# Patient Record
Sex: FEMALE | Race: WHITE | NOT HISPANIC OR LATINO | Employment: UNEMPLOYED | URBAN - NONMETROPOLITAN AREA
[De-identification: names, ages, dates, MRNs, and addresses within clinical notes are randomized per-mention and may not be internally consistent; named-entity substitution may affect disease eponyms.]

---
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
## 2025-02-24 ENCOUNTER — HOSPITAL ENCOUNTER (EMERGENCY)
Facility: HOSPITAL | Age: 20
Discharge: HOME | End: 2025-02-24
Attending: STUDENT IN AN ORGANIZED HEALTH CARE EDUCATION/TRAINING PROGRAM | Admitting: STUDENT IN AN ORGANIZED HEALTH CARE EDUCATION/TRAINING PROGRAM
Payer: MEDICAID

## 2025-02-24 ENCOUNTER — APPOINTMENT (OUTPATIENT)
Dept: GENERAL RADIOLOGY | Facility: HOSPITAL | Age: 20
End: 2025-02-24
Payer: MEDICAID

## 2025-02-24 VITALS
WEIGHT: 240 LBS | OXYGEN SATURATION: 99 % | DIASTOLIC BLOOD PRESSURE: 89 MMHG | SYSTOLIC BLOOD PRESSURE: 141 MMHG | HEIGHT: 64 IN | RESPIRATION RATE: 18 BRPM | BODY MASS INDEX: 40.97 KG/M2 | TEMPERATURE: 98.4 F | HEART RATE: 100 BPM

## 2025-02-24 DIAGNOSIS — S82.831A CLOSED FRACTURE OF DISTAL END OF RIGHT FIBULA, UNSPECIFIED FRACTURE MORPHOLOGY, INITIAL ENCOUNTER: Primary | ICD-10-CM

## 2025-02-24 PROCEDURE — 97162 PT EVAL MOD COMPLEX 30 MIN: CPT

## 2025-02-24 PROCEDURE — 73610 X-RAY EXAM OF ANKLE: CPT

## 2025-02-24 PROCEDURE — 99283 EMERGENCY DEPT VISIT LOW MDM: CPT | Performed by: STUDENT IN AN ORGANIZED HEALTH CARE EDUCATION/TRAINING PROGRAM

## 2025-02-24 PROCEDURE — 63710000001 ONDANSETRON ODT 4 MG TABLET DISPERSIBLE: Performed by: PHYSICIAN ASSISTANT

## 2025-02-24 RX ORDER — ACETAMINOPHEN 500 MG
1000 TABLET ORAL ONCE
Status: COMPLETED | OUTPATIENT
Start: 2025-02-24 | End: 2025-02-24

## 2025-02-24 RX ORDER — VENLAFAXINE HYDROCHLORIDE 37.5 MG/1
37.5 CAPSULE, EXTENDED RELEASE ORAL
COMMUNITY
Start: 2025-01-23

## 2025-02-24 RX ORDER — MONTELUKAST SODIUM 10 MG/1
1 TABLET ORAL DAILY
COMMUNITY
Start: 2024-12-05

## 2025-02-24 RX ORDER — OXYCODONE AND ACETAMINOPHEN 5; 325 MG/1; MG/1
1 TABLET ORAL EVERY 6 HOURS PRN
Qty: 12 TABLET | Refills: 0 | Status: SHIPPED | OUTPATIENT
Start: 2025-02-24 | End: 2025-02-28 | Stop reason: HOSPADM

## 2025-02-24 RX ORDER — ONDANSETRON 4 MG/1
4 TABLET, ORALLY DISINTEGRATING ORAL ONCE
Status: COMPLETED | OUTPATIENT
Start: 2025-02-24 | End: 2025-02-24

## 2025-02-24 RX ORDER — LEVOCETIRIZINE DIHYDROCHLORIDE 5 MG/1
1 TABLET, FILM COATED ORAL DAILY
COMMUNITY
Start: 2024-12-05

## 2025-02-24 RX ORDER — OXYCODONE HYDROCHLORIDE 5 MG/1
5 TABLET ORAL ONCE
Status: COMPLETED | OUTPATIENT
Start: 2025-02-24 | End: 2025-02-24

## 2025-02-24 RX ORDER — BUSPIRONE HYDROCHLORIDE 5 MG/1
1 TABLET ORAL EVERY 12 HOURS SCHEDULED
COMMUNITY
Start: 2025-01-23

## 2025-02-24 RX ORDER — ONDANSETRON 4 MG/1
4 TABLET, ORALLY DISINTEGRATING ORAL EVERY 8 HOURS PRN
Qty: 12 TABLET | Refills: 0 | Status: SHIPPED | OUTPATIENT
Start: 2025-02-24

## 2025-02-24 RX ADMIN — ACETAMINOPHEN 1000 MG: 500 TABLET, FILM COATED ORAL at 08:49

## 2025-02-24 RX ADMIN — OXYCODONE HYDROCHLORIDE 5 MG: 5 TABLET ORAL at 09:35

## 2025-02-24 RX ADMIN — ONDANSETRON 4 MG: 4 TABLET, ORALLY DISINTEGRATING ORAL at 09:35

## 2025-02-24 NOTE — ED PROVIDER NOTES
"Subjective:  Chief Complaint:  Fall    History of Present Illness:  Patient is a 19-year-old female with history of anxiety, asthma, depression, hypertension presenting to the ER with complaints of right ankle pain after mechanical fall from a black tarp this morning.  This happened just prior to arrival.  She complains of pain to the ankle but no other injuries.  Denies head trauma, loss of consciousness, anticoagulant use.  Denies any medications prior to arrival.  Denies any chance of pregnancy.      Nurses Notes reviewed and agree, including vitals, allergies, social history and prior medical history.     REVIEW OF SYSTEMS: All systems reviewed and not pertinent unless noted.  Review of Systems   Musculoskeletal:         Right ankle pain   All other systems reviewed and are negative.      Past Medical History:   Diagnosis Date    Anxiety     Asthma     Depression     Hypertension        Allergies:    Ibuprofen      History reviewed. No pertinent surgical history.      Social History     Socioeconomic History    Marital status: Single         History reviewed. No pertinent family history.    Objective  Physical Exam:  /89   Pulse 100   Temp 98.4 °F (36.9 °C) (Oral)   Resp 18   Ht 162.6 cm (64\")   Wt 109 kg (240 lb)   SpO2 99%   BMI 41.20 kg/m²      Physical Exam  Vitals and nursing note reviewed.   Constitutional:       General: She is not in acute distress.     Appearance: She is not toxic-appearing.   HENT:      Head: Normocephalic and atraumatic.      Right Ear: External ear normal.      Left Ear: External ear normal.      Nose: Nose normal.   Eyes:      Extraocular Movements: Extraocular movements intact.      Conjunctiva/sclera: Conjunctivae normal.   Cardiovascular:      Rate and Rhythm: Normal rate.   Pulmonary:      Effort: Pulmonary effort is normal. No respiratory distress.   Abdominal:      General: There is no distension.   Musculoskeletal:      Cervical back: Normal range of motion and " neck supple.      Comments: Right lower extremity: 2+ DP and PT pulses, normal color, no obvious deformity, no significant swelling, tenderness to palpation noted and mild pain with range of motion   Skin:     General: Skin is warm and dry.   Neurological:      General: No focal deficit present.      Mental Status: She is alert and oriented to person, place, and time.   Psychiatric:         Mood and Affect: Mood normal.         Behavior: Behavior normal.         Splint - Cast - Strapping    Date/Time: 2/24/2025 9:29 AM    Performed by: Patricia Francis PA-C  Authorized by: Marc Genao MD    Consent:     Consent obtained:  Verbal    Consent given by:  Patient    Risks, benefits, and alternatives were discussed: yes      Risks discussed:  Discoloration, numbness, pain and swelling    Alternatives discussed:  Referral  Universal protocol:     Patient identity confirmed:  Verbally with patient  Pre-procedure details:     Distal neurologic exam:  Normal    Distal perfusion: distal pulses strong and brisk capillary refill    Procedure details:     Location:  Leg    Leg location:  R lower leg    Splint type:  Ankle stirrup and short leg    Supplies:  Fiberglass    Attestation: Splint applied and adjusted personally by me    Post-procedure details:     Distal neurologic exam:  Normal    Distal perfusion: unchanged      Procedure completion:  Tolerated well, no immediate complications      ED Course:         Lab Results (last 24 hours)       ** No results found for the last 24 hours. **             XR Ankle 3+ View Right    Result Date: 2/24/2025  PROCEDURE: XR ANKLE 3+ VW RIGHT-  HISTORY: pain after fall  FINDINGS: A three view exam demonstrates oblique, minimally displaced distal right fibular metaphyseal fracture associated with mild soft tissue swelling. No ankle effusion seen..  No other fracture identified.      Impression: Acute fracture as above.      This report was signed and finalized on 2/24/2025 9:05 AM by  Letitia Verde MD.          MDM  Patient evaluated in the ER for right ankle pain after mechanical fall this morning prior to arrival.  She is hemodynamically stable, afebrile, nontoxic-appearing on exam.  No obvious swelling or deformity, neurovascularly intact extremity.  Differential diagnosis includes but is not limited to fracture, sprain/strain, among others.  Initial plan includes x-ray of right ankle and initial treatment with Tylenol and RICE therapy.    X-ray as reported above reveals an acute distal right fibular fracture, minimally displaced.  Imaging was reviewed by myself as well.  Do not feel that reduction is necessary.  Patient placed in a short leg splint with a stirrup.  She tolerated the procedure well.  There were no immediate complications.  She was given oxycodone in the ER for pain control.  Prescription was sent for Percocet, Zofran.  Patient given crutches.  Referral placed for Dr. Penn, orthopedic specialist, for further outpatient evaluation and definitive care.  Precautions were given for return to the ER for any new or worsening symptoms.    Final diagnoses:   Closed fracture of distal end of right fibula, unspecified fracture morphology, initial encounter          Patricia Francis, LAURA  02/24/25 0974

## 2025-02-24 NOTE — DISCHARGE INSTRUCTIONS
Keep splint dry and in place until follow-up with orthopedic specialist, Dr. Penn, for further outpatient evaluation and definitive care.  Take pain and nausea medications as prescribed as needed.  Rest, ice, elevate the extremity at home.  Return to the ER for any new or worsening symptoms or acute concerns.

## 2025-02-24 NOTE — THERAPY EVALUATION
Patient Name: Eri Lay  : 2005    MRN: [id number]                              Today's Date: 2025       Admit Date: 2025    Visit Dx:     ICD-10-CM ICD-9-CM   1. Closed fracture of distal end of right fibula, unspecified fracture morphology, initial encounter  S82.831A 824.8     There is no problem list on file for this patient.    Past Medical History:   Diagnosis Date    Anxiety     Asthma     Depression     Hypertension      History reviewed. No pertinent surgical history.   General Information       Row Name 25 1221          Physical Therapy Time and Intention    Document Type discharge evaluation/summary  -MS     Mode of Treatment physical therapy  -MS       Row Name 25 1221          General Information    Patient Profile Reviewed yes  -MS     Prior Level of Function independent:;all household mobility  -MS               User Key  (r) = Recorded By, (t) = Taken By, (c) = Cosigned By      Initials Name Provider Type    Nathan Cruz, PT Physical Therapist                   Mobility       Row Name 25 1222          Sit-Stand Transfer    Sit-Stand [place] (Transfers) modified independence  -MS     Assistive Device (Sit-Stand Transfers) walker, front-wheeled  -MS       Row Name 25 1222          Gait/Stairs (Locomotion)    [place] Level (Gait) modified independence  -MS     Assistive Device (Gait) walker, front-wheeled  -MS     Patient was able to Ambulate yes  -MS     Distance in Feet (Gait) 30  -MS     Deviations/Abnormal Patterns (Gait) gait speed decreased  -MS       Row Name 25 1222          Mobility    Extremity Weight-bearing Status right lower extremity  -MS     Right Lower Extremity (Weight-bearing Status) non weight-bearing (NWB)  -MS               User Key  (r) = Recorded By, (t) = Taken By, (c) = Cosigned By      Initials Name Provider Type    Nathan Cruz, PT Physical Therapist                   Obj/Interventions       Row Name  02/24/25 1224          Range of Motion Comprehensive    General Range of Motion lower extremity range of motion deficits identified  -MS       Row Name 02/24/25 1224          Strength Comprehensive (MMT)    General Manual Muscle Testing (MMT) Assessment lower extremity strength deficits identified  -MS     Comment, General Manual Muscle Testing (MMT) Assessment B LE 4-/5  -MS       Row Name 02/24/25 1224          Sensory Assessment (Somatosensory)    Sensory Assessment (Somatosensory) sensation intact  -MS               User Key  (r) = Recorded By, (t) = Taken By, (c) = Cosigned By      Initials Name Provider Type    MS Nathan Chowdhury, PT Physical Therapist                   Goals/Plan    No documentation.                  Clinical Impression       Row Name 02/24/25 1225          Pain    Pretreatment Pain Rating 5/10  -MS     Posttreatment Pain Rating 5/10  -MS     Pain Side/Orientation right;lower  leg  -MS       Row Name 02/24/25 1225          Plan of Care Review    Plan of Care Reviewed With patient;friend  -MS     Progress improving  -MS     Outcome Evaluation Pt seen for PT eval this date due to difficulty with ambulation. Pt presents with R fibula fx. Pt is NWB. Pt was able to ambulate 30ft with Rwx and good technique. Pt's concern is that she resides on a 4th floor dorm room, and the dorm does not have an elevator. Recommended Pt reach out to the disability office at [facility] for assistance and acommadations. Pt discharging today.  -MS       Row Name 02/24/25 1225          Therapy Assessment/Plan (PT)    Criteria for Skilled Interventions Met (PT) no;no problems identified which require skilled intervention  -MS     Therapy Frequency (PT) evaluation only  -MS       Row Name 02/24/25 1225          Positioning and Restraints    Pre-Treatment Position sitting in chair/recliner  -MS     Post Treatment Position chair  -MS               User Key  (r) = Recorded By, (t) = Taken By, (c) = Cosigned By      Initials Name  Provider Type    Nathan Cruz, PT Physical Therapist                   Outcome Measures       Row Name 02/24/25 1248          How much help from another person do you currently need...    Turning from your back to your side while in flat bed without using bedrails? 4  -MS     Moving from lying on back to sitting on the side of a flat bed without bedrails? 4  -MS     Moving to and from a bed to a chair (including a wheelchair)? 4  -MS     Standing up from a chair using your arms (e.g., wheelchair, bedside chair)? 4  -MS     Climbing 3-5 steps with a railing? 1  -MS     To walk in hospital room? 3  -MS     AM-PAC 6 Clicks Score (PT) 20  -MS     Highest Level of Mobility Goal 6 --> Walk 10 steps or more  -MS       Row Name 02/24/25 1248          Functional Assessment    Outcome Measure Options AM-PAC 6 Clicks Basic Mobility (PT)  -MS               User Key  (r) = Recorded By, (t) = Taken By, (c) = Cosigned By      Initials Name Provider Type    Nathan Cruz, PT Physical Therapist                                   PT Recommendation and Plan     Progress: improving  Outcome Evaluation: Pt seen for PT eval this date due to difficulty with ambulation. Pt presents with R fibula fx. Pt is NWB. Pt was able to ambulate 30ft with Rwx and good technique. Pt's concern is that she resides on a 4th floor dorm room, and the dorm does not have an elevator. Recommended Pt reach out to the disability office at [facility] for assistance and acommadations. Pt discharging today.     Time Calculation:         PT Charges       Row Name 02/24/25 1252             Time Calculation    Start Time 1100  -MS      PT Received On 02/24/25  -MS      PT Goal Re-Cert Due Date 03/06/25  -MS         Untimed Charges    PT Eval/Re-eval Minutes 56  -MS         Total Minutes    Untimed Charges Total Minutes 56  -MS       Total Minutes 56  -MS                User Key  (r) = Recorded By, (t) = Taken By, (c) = Cosigned By      Initials Name Provider Type     MS Nathan Chowdhury, PT Physical Therapist                  Therapy Charges for Today       Code Description Service Date Service Provider Modifiers Qty    [id number] HC PT EVAL MOD COMPLEXITY 4 2/24/2025 aNthan Chowdhury, PT GP 1            PT G-Codes  Outcome Measure Options: AM-PAC 6 Clicks Basic Mobility (PT)  AM-PAC 6 Clicks Score (PT): 20       Nathan Chowdhury, PT  2/24/2025

## 2025-02-24 NOTE — PLAN OF CARE
Goal Outcome Evaluation:  Plan of Care Reviewed With: patient, friend        Progress: improving  Outcome Evaluation: Pt seen for PT eval this date due to difficulty with ambulation. Pt presents with R fibula fx. Pt is NWB. Pt was able to ambulate 30ft with Rwx and good technique. Pt's concern is that she resides on a 4th floor dorm room, and the dorm does not have an elevator. Recommended Pt reach out to the disability office at [employer] for assistance and acommadations. Pt discharging today.

## 2025-02-26 ENCOUNTER — OFFICE VISIT (OUTPATIENT)
Dept: ORTHOPEDIC SURGERY | Facility: CLINIC | Age: 20
End: 2025-02-26
Payer: MEDICAID

## 2025-02-26 ENCOUNTER — HOSPITAL ENCOUNTER (OUTPATIENT)
Dept: MRI IMAGING | Facility: HOSPITAL | Age: 20
Discharge: HOME | End: 2025-02-26
Admitting: STUDENT IN AN ORGANIZED HEALTH CARE EDUCATION/TRAINING PROGRAM
Payer: MEDICAID

## 2025-02-26 VITALS
SYSTOLIC BLOOD PRESSURE: 130 MMHG | WEIGHT: 240 LBS | BODY MASS INDEX: 40.97 KG/M2 | DIASTOLIC BLOOD PRESSURE: 80 MMHG | HEIGHT: 64 IN

## 2025-02-26 DIAGNOSIS — S82.831A OTHER CLOSED FRACTURE OF DISTAL END OF RIGHT FIBULA, INITIAL ENCOUNTER: Primary | ICD-10-CM

## 2025-02-26 DIAGNOSIS — S82.831A OTHER CLOSED FRACTURE OF DISTAL END OF RIGHT FIBULA, INITIAL ENCOUNTER: ICD-10-CM

## 2025-02-26 DIAGNOSIS — M25.571 ARTHRALGIA OF ANKLE, RIGHT: ICD-10-CM

## 2025-02-26 PROCEDURE — 73721 MRI JNT OF LWR EXTRE W/O DYE: CPT

## 2025-02-26 NOTE — PROGRESS NOTES
Office Note     Name: Eri Lay    : 2005     MRN: [id number]     Chief Complaint  Fracture and Injury of the Right Leg (Right leg injury on 25. She rolled her ankle while walking on [facility]. Went to [facility] ER after she fell. Placed in splint. )    Subjective     History of Present Illness:  Eri Lay is a 19 y.o. female presenting today for the evaluation of acute right ankle injury that occurred on 2025 after the mechanism described above.  Patient complains of pain primarily over the lateral aspect of the ankle and distal lower leg.  She denies tenderness in the medial ankle, base of fifth metatarsal, foot.  She does have occasional tingling in her foot but denies numbness.  She denies previous injury to the affected area.  She was initially seen in the emergency department and placed in a short leg splint and given orthopedic follow-up.  She was prescribed Percocet by the ER for pain.  Presents with her mother.  Patient is a full-time student at the .    Pertinent ER note reviewed, 1 study of pertinent imaging reviewed.      Review of Systems     Past Medical History:   Diagnosis Date    Anxiety     Asthma     Depression     Hypertension         No past surgical history on file.    No family history on file.    Social History     Socioeconomic History    Marital status: Single         Current Outpatient Medications:     budesonide (PULMICORT) 90 MCG/ACT inhaler, Inhale 1 puff 2 (Two) Times a Day., Disp: , Rfl:     budesonide-formoterol (SYMBICORT) 160-4.5 MCG/ACT inhaler, Inhale 2 puffs 2 (Two) Times a Day., Disp: , Rfl:     busPIRone (BUSPAR) 5 MG tablet, Take 1 tablet by mouth Every 12 (Twelve) Hours., Disp: , Rfl:     levocetirizine (XYZAL) 5 MG tablet, Take 1 tablet by mouth Daily., Disp: , Rfl:     montelukast (SINGULAIR) 10 MG tablet, Take 1 tablet by mouth Daily., Disp: , Rfl:     ondansetron ODT (ZOFRAN-ODT) 4 MG disintegrating tablet, Place 1 tablet on the tongue Every 8  "(Eight) Hours As Needed for Nausea or Vomiting., Disp: 12 tablet, Rfl: 0    oxyCODONE-acetaminophen (PERCOCET) 5-325 MG per tablet, Take 1 tablet by mouth Every 6 (Six) Hours As Needed for Severe Pain., Disp: 12 tablet, Rfl: 0    venlafaxine XR (EFFEXOR-XR) 37.5 MG 24 hr capsule, Take 1 capsule by mouth every night at bedtime., Disp: , Rfl:     Allergies   Allergen Reactions    Ibuprofen Shortness Of Breath           Objective   /80   Ht 162.6 cm (64\")   Wt 109 kg (240 lb)   BMI 41.20 kg/m²    Pediatric BMI = >99 %ile (Z= 2.36) based on CDC (Girls, 2-20 Years) BMI-for-age based on BMI available on 2/26/2025.. Class 3 Severe Obesity (BMI >=40). Obesity-related health conditions include the following: none. Obesity is improving with lifestyle modifications. BMI is is above average; BMI management plan is completed. We discussed low calorie, low carb based diet program, portion control, increasing exercise, and joining a fitness center or start home based exercise program.       Physical Exam  Right Ankle Exam     Tenderness   The patient is experiencing tenderness in the lateral malleolus and ATF.  Swelling: mild    Other   Erythema: absent  Sensation: normal  Pulse: present     Comments:  Positive fibular compression test.  No tenderness along the fibula.  No tenderness at the fibular head.  Normal inspection of the right knee.  Distal neurovascular checks within normal limits.  Moves all toes well.  No tenderness at base of fifth metatarsal or Lisfranc joint.           Extremity DVT signs are negative by clinical screen.     Independent Review of Radiographic Studies:    I personally reviewed the available, pertinent imaging studies and I agree with the radiologist's interpretation.    XR Ankle 3+ View Right  Order date: 2/24/2025  Authorizing: Patricia Francis PA-C  Ordered by Patricia Francis PA-C on 2/24/2025.     Narrative & Impression    PROCEDURE: XR ANKLE 3+ VW RIGHT-     HISTORY: pain after fall   "   FINDINGS: A three view exam demonstrates oblique, minimally displaced  distal right fibular metaphyseal fracture associated with mild soft  tissue swelling. No ankle effusion seen..  No other fracture identified.     IMPRESSION:  Acute fracture as above.     This report was signed and finalized on 2/24/2025 9:05 AM by Letitia Verde MD.        All I do agree with the radiologist interpretation, there does appear to be mild widening of the medial clear space.  Measured at approximately 4.5 mm.    Procedures    Assessment and Plan   Diagnoses and all orders for this visit:    1. Other closed fracture of distal end of right fibula, initial encounter (Primary)  -     MRI Ankle Right Without Contrast; Future    2. Arthralgia of ankle, right       Discussion of orthopedic goals  Orthopedic activities reviewed and patient expressed appreciation  Risk, benefits, and merits of treatment alternatives reviewed with the patient and questions answered  Patient guided on mobility and conditioning exercises  Ice, heat, and/or modalities as needed and beneficial  Elevate leg for residual swelling  Reduced physical activity as appropriate and avoid aggravating activities.   Weight bearing parameters reviewed.   Use brace as instructed.     Recommendations/Plan:  Exercise, medications, injections, other patient advice, and return appointment as noted.  Brace: High tide pneumatic walking boot  Test/Studies: MRI right ankle without contrast stat  Patient and/or guardian(s) were encouraged to call or return to the office for any issues or concerns.    At this time a syndesmotic injury cannot be ruled out and physical exam and imaging is suggestive of syndesmotic injury.  An MRI of the right ankle stat was ordered for further evaluation.  Today patient was placed in a high tide pneumatic walking boot and advised nonweightbearing.  Advised the use of a knee scooter for ambulation.  Advise continuing Percocet as needed for pain once pain  improves transition to over-the-counter Tylenol as needed.  Will follow-up with patient after MRI for further evaluation.    Return for Follow-up after MRI.

## 2025-02-27 ENCOUNTER — PRE-ADMISSION TESTING (OUTPATIENT)
Dept: PREADMISSION TESTING | Facility: HOSPITAL | Age: 20
End: 2025-02-27
Payer: MEDICAID

## 2025-02-27 ENCOUNTER — OFFICE VISIT (OUTPATIENT)
Dept: ORTHOPEDIC SURGERY | Facility: CLINIC | Age: 20
End: 2025-02-27
Payer: MEDICAID

## 2025-02-27 ENCOUNTER — PREP FOR SURGERY (OUTPATIENT)
Dept: OTHER | Facility: HOSPITAL | Age: 20
End: 2025-02-27
Payer: MEDICAID

## 2025-02-27 ENCOUNTER — HOSPITAL ENCOUNTER (OUTPATIENT)
Dept: GENERAL RADIOLOGY | Facility: HOSPITAL | Age: 20
Discharge: HOME | End: 2025-02-27
Payer: MEDICAID

## 2025-02-27 VITALS
WEIGHT: 240 LBS | DIASTOLIC BLOOD PRESSURE: 68 MMHG | BODY MASS INDEX: 40.97 KG/M2 | HEIGHT: 64 IN | SYSTOLIC BLOOD PRESSURE: 126 MMHG

## 2025-02-27 VITALS — WEIGHT: 240 LBS | HEIGHT: 64 IN | BODY MASS INDEX: 40.97 KG/M2

## 2025-02-27 DIAGNOSIS — S82.831D OTHER CLOSED FRACTURE OF DISTAL END OF RIGHT FIBULA WITH ROUTINE HEALING, SUBSEQUENT ENCOUNTER: Primary | ICD-10-CM

## 2025-02-27 DIAGNOSIS — Z01.818 PRE-OP TESTING: Primary | ICD-10-CM

## 2025-02-27 DIAGNOSIS — S93.491D SPRAIN OF ANTERIOR TALOFIBULAR LIGAMENT OF RIGHT ANKLE, SUBSEQUENT ENCOUNTER: ICD-10-CM

## 2025-02-27 DIAGNOSIS — S93.431D SYNDESMOTIC DISRUPTION OF RIGHT ANKLE, SUBSEQUENT ENCOUNTER: ICD-10-CM

## 2025-02-27 DIAGNOSIS — Z01.818 PRE-OP TESTING: ICD-10-CM

## 2025-02-27 LAB
BASOPHILS # BLD AUTO: 0.1 10*3/MM3 (ref 0–0.2)
BASOPHILS NFR BLD AUTO: 1.1 % (ref 0–1.5)
BILIRUB UR QL STRIP: NEGATIVE
CLARITY UR: CLEAR
COLOR UR: YELLOW
DEPRECATED RDW RBC AUTO: 40.9 FL (ref 37–54)
EOSINOPHIL # BLD AUTO: 0.67 10*3/MM3 (ref 0–0.4)
EOSINOPHIL NFR BLD AUTO: 7.6 % (ref 0.3–6.2)
ERYTHROCYTE [DISTWIDTH] IN BLOOD BY AUTOMATED COUNT: 14.9 % (ref 12.3–15.4)
GLUCOSE UR STRIP-MCNC: NEGATIVE MG/DL
HCT VFR BLD AUTO: 42.2 % (ref 34–46.6)
HGB BLD-MCNC: 13 G/DL (ref 12–15.9)
HGB UR QL STRIP.AUTO: NEGATIVE
IMM GRANULOCYTES # BLD AUTO: 0.03 10*3/MM3 (ref 0–0.05)
IMM GRANULOCYTES NFR BLD AUTO: 0.3 % (ref 0–0.5)
KETONES UR QL STRIP: NEGATIVE
LEUKOCYTE ESTERASE UR QL STRIP.AUTO: NEGATIVE
LYMPHOCYTES # BLD AUTO: 1.87 10*3/MM3 (ref 0.7–3.1)
LYMPHOCYTES NFR BLD AUTO: 21.3 % (ref 19.6–45.3)
MCH RBC QN AUTO: 23.6 PG (ref 26.6–33)
MCHC RBC AUTO-ENTMCNC: 30.8 G/DL (ref 31.5–35.7)
MCV RBC AUTO: 76.4 FL (ref 79–97)
MONOCYTES # BLD AUTO: 0.6 10*3/MM3 (ref 0.1–0.9)
MONOCYTES NFR BLD AUTO: 6.8 % (ref 5–12)
NEUTROPHILS NFR BLD AUTO: 5.53 10*3/MM3 (ref 1.7–7)
NEUTROPHILS NFR BLD AUTO: 62.9 % (ref 42.7–76)
NITRITE UR QL STRIP: NEGATIVE
NRBC BLD AUTO-RTO: 0 /100 WBC (ref 0–0.2)
PH UR STRIP.AUTO: 6 [PH] (ref 5–8)
PLATELET # BLD AUTO: 432 10*3/MM3 (ref 140–450)
PMV BLD AUTO: 9.4 FL (ref 6–12)
PROT UR QL STRIP: NEGATIVE
RBC # BLD AUTO: 5.52 10*6/MM3 (ref 3.77–5.28)
SP GR UR STRIP: 1.02 (ref 1–1.03)
UROBILINOGEN UR QL STRIP: NORMAL
WBC NRBC COR # BLD AUTO: 8.8 10*3/MM3 (ref 3.4–10.8)

## 2025-02-27 PROCEDURE — 81003 URINALYSIS AUTO W/O SCOPE: CPT

## 2025-02-27 PROCEDURE — 36415 COLL VENOUS BLD VENIPUNCTURE: CPT

## 2025-02-27 PROCEDURE — 85025 COMPLETE CBC W/AUTO DIFF WBC: CPT

## 2025-02-27 PROCEDURE — 71046 X-RAY EXAM CHEST 2 VIEWS: CPT

## 2025-02-27 RX ORDER — FAMOTIDINE 10 MG/ML
20 INJECTION, SOLUTION INTRAVENOUS
Status: CANCELLED | OUTPATIENT
Start: 2025-02-28 | End: 2025-03-01

## 2025-02-27 NOTE — H&P (VIEW-ONLY)
Office Note     Name: Eri Lay    : 2005     MRN: [id number]     Chief Complaint  Follow-up of the Right Ankle (MRI results)    Subjective     History of Present Illness:  Eri Lay is a 19 y.o. female presenting today for right ankle follow-up and to discuss results of recent MRI.  MRI results will be shown below.  Denies any new or worsening symptoms since her last visit with me.      Review of Systems   Musculoskeletal:  Positive for arthralgias (right ankle) and joint swelling (right ankle).   Skin:  Positive for color change (right ankle/foot bruising).        Past Medical History:   Diagnosis Date    Anxiety     Asthma     Depression     Hypertension         History reviewed. No pertinent surgical history.    Family History   Problem Relation Age of Onset    Hypertension Mother     Heart disease Maternal Grandmother        Social History     Socioeconomic History    Marital status: Single   Tobacco Use    Smoking status: Never    Smokeless tobacco: Never   Vaping Use    Vaping status: Never Used   Substance and Sexual Activity    Alcohol use: Never    Drug use: Never    Sexual activity: Defer         Current Outpatient Medications:     budesonide (PULMICORT) 90 MCG/ACT inhaler, Inhale 1 puff 2 (Two) Times a Day., Disp: , Rfl:     budesonide-formoterol (SYMBICORT) 160-4.5 MCG/ACT inhaler, Inhale 2 puffs 2 (Two) Times a Day., Disp: , Rfl:     busPIRone (BUSPAR) 5 MG tablet, Take 1 tablet by mouth Every 12 (Twelve) Hours., Disp: , Rfl:     levocetirizine (XYZAL) 5 MG tablet, Take 1 tablet by mouth Daily., Disp: , Rfl:     montelukast (SINGULAIR) 10 MG tablet, Take 1 tablet by mouth Daily., Disp: , Rfl:     ondansetron ODT (ZOFRAN-ODT) 4 MG disintegrating tablet, Place 1 tablet on the tongue Every 8 (Eight) Hours As Needed for Nausea or Vomiting., Disp: 12 tablet, Rfl: 0    oxyCODONE-acetaminophen (PERCOCET) 5-325 MG per tablet, Take 1 tablet by mouth Every 6 (Six) Hours As Needed for Severe  "Pain., Disp: 12 tablet, Rfl: 0    venlafaxine XR (EFFEXOR-XR) 37.5 MG 24 hr capsule, Take 1 capsule by mouth every night at bedtime., Disp: , Rfl:     Allergies   Allergen Reactions    Ibuprofen Shortness Of Breath           Objective   /68 (BP Location: Left arm, Patient Position: Sitting)   Ht 162.6 cm (64\")   Wt 109 kg (240 lb)   BMI 41.20 kg/m²            Physical Exam  Constitutional:       Appearance: Normal appearance. She is obese. She is not ill-appearing.   Cardiovascular:      Rate and Rhythm: Normal rate and regular rhythm.      Heart sounds: Normal heart sounds.   Pulmonary:      Effort: Pulmonary effort is normal.      Breath sounds: Normal breath sounds.   Abdominal:      General: Abdomen is flat. Bowel sounds are normal.      Palpations: Abdomen is soft.   Psychiatric:         Mood and Affect: Mood normal.         Behavior: Behavior normal.         Thought Content: Thought content normal.         Judgment: Judgment normal.       Ortho Exam   Extremity DVT signs are negative by clinical screen.     Independent Review of Radiographic Studies:    MRI Ankle Right Without Contrast  Order date: 2/26/2025  Authorizing: Vj Seth PA-C  Ordered by Vj Seth PA-C on 2/26/2025.     Narrative & Impression    PROCEDURE: MRI ANKLE RIGHT WO CONTRAST-        MR RIGHT ANKLE        HISTORY: Acute distal fibula fracture, Calderón C, evaluate syndesmosis.   Mild widening medial clear space noted on plain films, positive fibular  compression test; S82.831A-Other fracture of upper and lower end of  right fibula, initial encounter for closed fracture     TECHNIQUE: Multiplanar MR without gadolinium enhancement.     FINDINGS:     ARTICULAR CARTILAGE: No focal defects.     MARROW SIGNAL: Normal.     JOINT FLUID: Moderate size joint effusion     LIGAMENTS: Complete tear of the anterior tibiofibular and talofibular  ligaments. Posterior talofibular and tibiofibular ligaments are intact.  There is disruption of " the tibiofibular syndesmosis.     TENDONS: Unremarkable.     PLANTAR FASCIA: Unremarkable.      IMPRESSION:  1. Complete tears of the ATFL and anterior tibiofibular ligament with  disruption of the tibiofibular syndesmosis  2. PT FL and posterior tibiofibular ligament intact  3. No acute osseous abnormality        This report was signed and finalized on 2/26/2025 1:37 PM by Levon Benavides MD.       Procedures    Assessment and Plan   Diagnoses and all orders for this visit:    1. Other closed fracture of distal end of right fibula with routine healing, subsequent encounter (Primary)    2. Sprain of anterior talofibular ligament of right ankle, subsequent encounter    3. Syndesmotic disruption of right ankle, subsequent encounter       Discussion of orthopedic goals  Orthopedic activities reviewed and patient expressed appreciation  Risk, benefits, and merits of treatment alternatives reviewed with the patient and questions answered  The nature of the proposed surgical procedure(s) was reviewed with the patient including the risks, benefits, rehabilitation, recovery timeframe, and outcome expectations  Ice, heat, and/or modalities as needed and beneficial  Elevate foot for residual swelling  Discussed proper wound care with the patient.   The signs and symptoms of an infection were discuss with the patient.  I advised observation for signs and symptoms of infection.  Discussed gentle range of motion activities/exercises for the affected joint with the patient.    Reduced physical activity as appropriate and avoid aggravating activities.   Weight bearing parameters reviewed.   Use brace as instructed.   Take prescribed medications as instructed and only as tolerated.  An assistive device was encouraged for safety and support. This may include crutches, a cane, walker, rollator.     Recommendations/Plan:  Exercise, medications, injections, other patient advice, and return appointment as noted.  Surgery: Surgery  proposed at this visit as noted.  Patient and/or guardian(s) were encouraged to call or return to the office for any issues or concerns.    I discussed conservative and surgical treatment options with the patient.  Given the disruption to the syndesmosis ligament I recommended surgical fixation of the fracture and ligament to decrease morbidity.  I discussed the risk benefits outcomes and expected recovery time of the proposed procedure and patient is agreeable to continue forward with surgery.  She will go over for PAT today.  Tentative surgery date of 2/28/2025.    Return for 2-week postop.

## 2025-02-27 NOTE — DISCHARGE INSTRUCTIONS
Pre-Admission testing appointment completed today for patient's upcoming procedure here at [facility].    PAT PASS reviewed with patient and they verbalize understanding of the following:     Do not eat or drink anything after midnight the night before procedure unless otherwise instructed by physician/surgeon's office, this includes no gum, candy, mints, tobacco products or e-cigarettes.  Do not shave the area to be operated on at least 48 hours prior to procedure.  Do not wear makeup, lotion, hair products, or nail polish.  Do not wear any jewelry and remove all piercings.  Do not wear any adhesive if you wear dentures.  Do not wear contacts; bring in glasses if needed.  Only take medications on the morning of procedure as instructed by PAT nurse per anesthesia guidelines or as instructed by physician's office.  If you are on any blood thinners reach out to the physician/surgeon's office for instructions on when/if they will need to be stopped prior to procedure.  Bring in picture ID and insurance card, advanced directive copies if applicable, CPAP/BIPAP/Inhalers if indicated morning of procedure, leave any other valuables at home.  Ensure you have arranged for someone to drive you home the day of your procedure and someone to care for you at home afterwards. It is recommended that you do not drive, drink alcohol, or make any major legal decisions for at least 24 hours after your procedure is complete.  Chlorhexadine sponge along with instruction/information sheet given to patient. Readybath wipes given in lieu of CHG if patient has CHG allergy. Instructed patient to date, time, and initial the verification sheet once skin prep has been completed, and to return to [facility] the day of the procedure.  ERAS instructions given to patient unless otherwise instructed per surgeon's orders.     Anesthesia FAQ tip sheet given to patient.  Instructions and information given to patient about parking, hospital  entrance, and registration location.

## 2025-02-27 NOTE — PROGRESS NOTES
Office Note     Name: Eri Lay    : 2005     MRN: [id number]     Chief Complaint  Follow-up of the Right Ankle (MRI results)    Subjective     History of Present Illness:  Eri Lay is a 19 y.o. female presenting today for right ankle follow-up and to discuss results of recent MRI.  MRI results will be shown below.  Denies any new or worsening symptoms since her last visit with me.      Review of Systems   Musculoskeletal:  Positive for arthralgias (right ankle) and joint swelling (right ankle).   Skin:  Positive for color change (right ankle/foot bruising).        Past Medical History:   Diagnosis Date    Anxiety     Asthma     Depression     Hypertension         History reviewed. No pertinent surgical history.    Family History   Problem Relation Age of Onset    Hypertension Mother     Heart disease Maternal Grandmother        Social History     Socioeconomic History    Marital status: Single   Tobacco Use    Smoking status: Never    Smokeless tobacco: Never   Vaping Use    Vaping status: Never Used   Substance and Sexual Activity    Alcohol use: Never    Drug use: Never    Sexual activity: Defer         Current Outpatient Medications:     budesonide (PULMICORT) 90 MCG/ACT inhaler, Inhale 1 puff 2 (Two) Times a Day., Disp: , Rfl:     budesonide-formoterol (SYMBICORT) 160-4.5 MCG/ACT inhaler, Inhale 2 puffs 2 (Two) Times a Day., Disp: , Rfl:     busPIRone (BUSPAR) 5 MG tablet, Take 1 tablet by mouth Every 12 (Twelve) Hours., Disp: , Rfl:     levocetirizine (XYZAL) 5 MG tablet, Take 1 tablet by mouth Daily., Disp: , Rfl:     montelukast (SINGULAIR) 10 MG tablet, Take 1 tablet by mouth Daily., Disp: , Rfl:     ondansetron ODT (ZOFRAN-ODT) 4 MG disintegrating tablet, Place 1 tablet on the tongue Every 8 (Eight) Hours As Needed for Nausea or Vomiting., Disp: 12 tablet, Rfl: 0    oxyCODONE-acetaminophen (PERCOCET) 5-325 MG per tablet, Take 1 tablet by mouth Every 6 (Six) Hours As Needed for Severe  "Pain., Disp: 12 tablet, Rfl: 0    venlafaxine XR (EFFEXOR-XR) 37.5 MG 24 hr capsule, Take 1 capsule by mouth every night at bedtime., Disp: , Rfl:     Allergies   Allergen Reactions    Ibuprofen Shortness Of Breath           Objective   /68 (BP Location: Left arm, Patient Position: Sitting)   Ht 162.6 cm (64\")   Wt 109 kg (240 lb)   BMI 41.20 kg/m²            Physical Exam  Constitutional:       Appearance: Normal appearance. She is obese. She is not ill-appearing.   Cardiovascular:      Rate and Rhythm: Normal rate and regular rhythm.      Heart sounds: Normal heart sounds.   Pulmonary:      Effort: Pulmonary effort is normal.      Breath sounds: Normal breath sounds.   Abdominal:      General: Abdomen is flat. Bowel sounds are normal.      Palpations: Abdomen is soft.   Psychiatric:         Mood and Affect: Mood normal.         Behavior: Behavior normal.         Thought Content: Thought content normal.         Judgment: Judgment normal.       Ortho Exam   Extremity DVT signs are negative by clinical screen.     Independent Review of Radiographic Studies:    MRI Ankle Right Without Contrast  Order date: 2/26/2025  Authorizing: Vj Seth PA-C  Ordered by Vj Seth PA-C on 2/26/2025.     Narrative & Impression    PROCEDURE: MRI ANKLE RIGHT WO CONTRAST-        MR RIGHT ANKLE        HISTORY: Acute distal fibula fracture, Calderón C, evaluate syndesmosis.   Mild widening medial clear space noted on plain films, positive fibular  compression test; S82.831A-Other fracture of upper and lower end of  right fibula, initial encounter for closed fracture     TECHNIQUE: Multiplanar MR without gadolinium enhancement.     FINDINGS:     ARTICULAR CARTILAGE: No focal defects.     MARROW SIGNAL: Normal.     JOINT FLUID: Moderate size joint effusion     LIGAMENTS: Complete tear of the anterior tibiofibular and talofibular  ligaments. Posterior talofibular and tibiofibular ligaments are intact.  There is disruption of " the tibiofibular syndesmosis.     TENDONS: Unremarkable.     PLANTAR FASCIA: Unremarkable.      IMPRESSION:  1. Complete tears of the ATFL and anterior tibiofibular ligament with  disruption of the tibiofibular syndesmosis  2. PT FL and posterior tibiofibular ligament intact  3. No acute osseous abnormality        This report was signed and finalized on 2/26/2025 1:37 PM by Levon Benavides MD.       Procedures    Assessment and Plan   Diagnoses and all orders for this visit:    1. Other closed fracture of distal end of right fibula with routine healing, subsequent encounter (Primary)    2. Sprain of anterior talofibular ligament of right ankle, subsequent encounter    3. Syndesmotic disruption of right ankle, subsequent encounter       Discussion of orthopedic goals  Orthopedic activities reviewed and patient expressed appreciation  Risk, benefits, and merits of treatment alternatives reviewed with the patient and questions answered  The nature of the proposed surgical procedure(s) was reviewed with the patient including the risks, benefits, rehabilitation, recovery timeframe, and outcome expectations  Ice, heat, and/or modalities as needed and beneficial  Elevate foot for residual swelling  Discussed proper wound care with the patient.   The signs and symptoms of an infection were discuss with the patient.  I advised observation for signs and symptoms of infection.  Discussed gentle range of motion activities/exercises for the affected joint with the patient.    Reduced physical activity as appropriate and avoid aggravating activities.   Weight bearing parameters reviewed.   Use brace as instructed.   Take prescribed medications as instructed and only as tolerated.  An assistive device was encouraged for safety and support. This may include crutches, a cane, walker, rollator.     Recommendations/Plan:  Exercise, medications, injections, other patient advice, and return appointment as noted.  Surgery: Surgery  proposed at this visit as noted.  Patient and/or guardian(s) were encouraged to call or return to the office for any issues or concerns.    I discussed conservative and surgical treatment options with the patient.  Given the disruption to the syndesmosis ligament I recommended surgical fixation of the fracture and ligament to decrease morbidity.  I discussed the risk benefits outcomes and expected recovery time of the proposed procedure and patient is agreeable to continue forward with surgery.  She will go over for PAT today.  Tentative surgery date of 2/28/2025.    Return for 2-week postop.

## 2025-02-28 ENCOUNTER — ANESTHESIA (OUTPATIENT)
Dept: PERIOP | Facility: HOSPITAL | Age: 20
End: 2025-02-28
Payer: MEDICAID

## 2025-02-28 ENCOUNTER — HOSPITAL ENCOUNTER (OUTPATIENT)
Facility: HOSPITAL | Age: 20
Setting detail: HOSPITAL OUTPATIENT SURGERY
Discharge: HOME | End: 2025-02-28
Attending: ORTHOPAEDIC SURGERY | Admitting: ORTHOPAEDIC SURGERY
Payer: MEDICAID

## 2025-02-28 ENCOUNTER — APPOINTMENT (OUTPATIENT)
Dept: GENERAL RADIOLOGY | Facility: HOSPITAL | Age: 20
End: 2025-02-28
Payer: MEDICAID

## 2025-02-28 ENCOUNTER — ANESTHESIA EVENT CONVERTED (OUTPATIENT)
Dept: ANESTHESIOLOGY | Facility: HOSPITAL | Age: 20
End: 2025-02-28
Payer: MEDICAID

## 2025-02-28 ENCOUNTER — ANESTHESIA EVENT (OUTPATIENT)
Dept: PERIOP | Facility: HOSPITAL | Age: 20
End: 2025-02-28
Payer: MEDICAID

## 2025-02-28 VITALS
OXYGEN SATURATION: 95 % | HEART RATE: 122 BPM | SYSTOLIC BLOOD PRESSURE: 146 MMHG | DIASTOLIC BLOOD PRESSURE: 86 MMHG | TEMPERATURE: 97.9 F | RESPIRATION RATE: 16 BRPM

## 2025-02-28 DIAGNOSIS — S82.831D OTHER CLOSED FRACTURE OF DISTAL END OF RIGHT FIBULA WITH ROUTINE HEALING, SUBSEQUENT ENCOUNTER: Primary | ICD-10-CM

## 2025-02-28 DIAGNOSIS — Z01.818 PRE-OP TESTING: ICD-10-CM

## 2025-02-28 DIAGNOSIS — Z87.81 STATUS POST OPEN REDUCTION WITH INTERNAL FIXATION (ORIF) OF FRACTURE OF ANKLE: Primary | ICD-10-CM

## 2025-02-28 DIAGNOSIS — Z98.890 STATUS POST OPEN REDUCTION WITH INTERNAL FIXATION (ORIF) OF FRACTURE OF ANKLE: Primary | ICD-10-CM

## 2025-02-28 LAB
B-HCG UR QL: NEGATIVE
EXPIRATION DATE: NORMAL
INTERNAL NEGATIVE CONTROL: NEGATIVE
INTERNAL POSITIVE CONTROL: POSITIVE
Lab: NORMAL

## 2025-02-28 PROCEDURE — C1713 ANCHOR/SCREW BN/BN,TIS/BN: HCPCS | Performed by: ORTHOPAEDIC SURGERY

## 2025-02-28 PROCEDURE — 25010000002 HALOPERIDOL LACTATE PER 5 MG: Performed by: NURSE ANESTHETIST, CERTIFIED REGISTERED

## 2025-02-28 PROCEDURE — 25010000002 PROPOFOL 200 MG/20ML EMULSION: Performed by: NURSE ANESTHETIST, CERTIFIED REGISTERED

## 2025-02-28 PROCEDURE — 25010000002 CEFAZOLIN PER 500 MG: Performed by: ORTHOPAEDIC SURGERY

## 2025-02-28 PROCEDURE — 25010000002 CEFAZOLIN PER 500 MG: Performed by: STUDENT IN AN ORGANIZED HEALTH CARE EDUCATION/TRAINING PROGRAM

## 2025-02-28 PROCEDURE — 25010000002 MIDAZOLAM PER 1MG: Performed by: NURSE ANESTHETIST, CERTIFIED REGISTERED

## 2025-02-28 PROCEDURE — 25010000002 DEXAMETHASONE SODIUM PHOSPHATE 10 MG/ML SOLUTION: Performed by: NURSE ANESTHETIST, CERTIFIED REGISTERED

## 2025-02-28 PROCEDURE — 76000 FLUOROSCOPY <1 HR PHYS/QHP: CPT

## 2025-02-28 PROCEDURE — 25010000002 HYDROMORPHONE PER 4 MG: Performed by: NURSE ANESTHETIST, CERTIFIED REGISTERED

## 2025-02-28 PROCEDURE — 27829 TREAT LOWER LEG JOINT: CPT | Performed by: ORTHOPAEDIC SURGERY

## 2025-02-28 PROCEDURE — 25010000002 ONDANSETRON PER 1 MG: Performed by: NURSE ANESTHETIST, CERTIFIED REGISTERED

## 2025-02-28 PROCEDURE — 25810000003 LACTATED RINGERS SOLUTION: Performed by: NURSE ANESTHETIST, CERTIFIED REGISTERED

## 2025-02-28 PROCEDURE — 76000 FLUOROSCOPY <1 HR PHYS/QHP: CPT | Performed by: ORTHOPAEDIC SURGERY

## 2025-02-28 PROCEDURE — 25010000002 DEXAMETHASONE PER 1 MG: Performed by: NURSE ANESTHETIST, CERTIFIED REGISTERED

## 2025-02-28 PROCEDURE — 25810000003 LACTATED RINGERS PER 1000 ML: Performed by: ORTHOPAEDIC SURGERY

## 2025-02-28 PROCEDURE — 81025 URINE PREGNANCY TEST: CPT | Performed by: ORTHOPAEDIC SURGERY

## 2025-02-28 PROCEDURE — 25010000002 FENTANYL CITRATE (PF) 50 MCG/ML SOLUTION PREFILLED SYRINGE: Performed by: NURSE ANESTHETIST, CERTIFIED REGISTERED

## 2025-02-28 PROCEDURE — 27792 TREATMENT OF ANKLE FRACTURE: CPT | Performed by: ORTHOPAEDIC SURGERY

## 2025-02-28 DEVICE — SCRW CORT S/TAP 3.5X18MM: Type: IMPLANTABLE DEVICE | Site: ANKLE | Status: FUNCTIONAL

## 2025-02-28 DEVICE — SCRW LK S/TAP STRDRV 3.5X14MM: Type: IMPLANTABLE DEVICE | Site: ANKLE | Status: FUNCTIONAL

## 2025-02-28 DEVICE — IMPLANTABLE DEVICE: Type: IMPLANTABLE DEVICE | Site: ANKLE | Status: FUNCTIONAL

## 2025-02-28 DEVICE — SCRW CORT S/TAP 3.5X12MM: Type: IMPLANTABLE DEVICE | Site: ANKLE | Status: FUNCTIONAL

## 2025-02-28 DEVICE — SCRW LK S/TAP STRDRV 3.5X12MM: Type: IMPLANTABLE DEVICE | Site: ANKLE | Status: FUNCTIONAL

## 2025-02-28 RX ORDER — PROPOFOL 10 MG/ML
INJECTION, EMULSION INTRAVENOUS AS NEEDED
Status: DISCONTINUED | OUTPATIENT
Start: 2025-02-28 | End: 2025-02-28 | Stop reason: SURG

## 2025-02-28 RX ORDER — MIDAZOLAM HYDROCHLORIDE 2 MG/2ML
INJECTION, SOLUTION INTRAMUSCULAR; INTRAVENOUS AS NEEDED
Status: DISCONTINUED | OUTPATIENT
Start: 2025-02-28 | End: 2025-02-28 | Stop reason: SURG

## 2025-02-28 RX ORDER — SUCCINYLCHOLINE/SOD CL,ISO/PF 200MG/10ML
SYRINGE (ML) INTRAVENOUS AS NEEDED
Status: DISCONTINUED | OUTPATIENT
Start: 2025-02-28 | End: 2025-02-28 | Stop reason: SURG

## 2025-02-28 RX ORDER — LIDOCAINE HCL/PF 100 MG/5ML
SYRINGE (ML) INJECTION AS NEEDED
Status: DISCONTINUED | OUTPATIENT
Start: 2025-02-28 | End: 2025-02-28 | Stop reason: SURG

## 2025-02-28 RX ORDER — DEXAMETHASONE SODIUM PHOSPHATE 10 MG/ML
INJECTION, SOLUTION INTRAMUSCULAR; INTRAVENOUS AS NEEDED
Status: DISCONTINUED | OUTPATIENT
Start: 2025-02-28 | End: 2025-02-28 | Stop reason: SURG

## 2025-02-28 RX ORDER — BUPIVACAINE HYDROCHLORIDE AND EPINEPHRINE 5; 5 MG/ML; UG/ML
INJECTION, SOLUTION EPIDURAL; INTRACAUDAL; PERINEURAL
Status: COMPLETED | OUTPATIENT
Start: 2025-02-28 | End: 2025-02-28

## 2025-02-28 RX ORDER — ONDANSETRON 2 MG/ML
INJECTION INTRAMUSCULAR; INTRAVENOUS AS NEEDED
Status: DISCONTINUED | OUTPATIENT
Start: 2025-02-28 | End: 2025-02-28 | Stop reason: SURG

## 2025-02-28 RX ORDER — ROCURONIUM BROMIDE 50 MG/5 ML
SYRINGE (ML) INTRAVENOUS AS NEEDED
Status: DISCONTINUED | OUTPATIENT
Start: 2025-02-28 | End: 2025-02-28 | Stop reason: SURG

## 2025-02-28 RX ORDER — IPRATROPIUM BROMIDE AND ALBUTEROL SULFATE 2.5; .5 MG/3ML; MG/3ML
3 SOLUTION RESPIRATORY (INHALATION) ONCE AS NEEDED
Status: DISCONTINUED | OUTPATIENT
Start: 2025-02-28 | End: 2025-02-28 | Stop reason: HOSPADM

## 2025-02-28 RX ORDER — SODIUM CHLORIDE 0.9 % (FLUSH) 0.9 %
10 SYRINGE (ML) INJECTION AS NEEDED
Status: DISCONTINUED | OUTPATIENT
Start: 2025-02-28 | End: 2025-02-28 | Stop reason: HOSPADM

## 2025-02-28 RX ORDER — KETAMINE HCL IN NACL, ISO-OSM 100MG/10ML
SYRINGE (ML) INJECTION AS NEEDED
Status: DISCONTINUED | OUTPATIENT
Start: 2025-02-28 | End: 2025-02-28 | Stop reason: SURG

## 2025-02-28 RX ORDER — SODIUM CHLORIDE, SODIUM LACTATE, POTASSIUM CHLORIDE, CALCIUM CHLORIDE 600; 310; 30; 20 MG/100ML; MG/100ML; MG/100ML; MG/100ML
1000 INJECTION, SOLUTION INTRAVENOUS CONTINUOUS
Status: DISCONTINUED | OUTPATIENT
Start: 2025-02-28 | End: 2025-02-28 | Stop reason: HOSPADM

## 2025-02-28 RX ORDER — FAMOTIDINE 10 MG/ML
20 INJECTION, SOLUTION INTRAVENOUS
Status: COMPLETED | OUTPATIENT
Start: 2025-02-28 | End: 2025-02-28

## 2025-02-28 RX ORDER — ONDANSETRON 2 MG/ML
4 INJECTION INTRAMUSCULAR; INTRAVENOUS ONCE AS NEEDED
Status: DISCONTINUED | OUTPATIENT
Start: 2025-02-28 | End: 2025-02-28 | Stop reason: HOSPADM

## 2025-02-28 RX ORDER — HYDROCODONE BITARTRATE AND ACETAMINOPHEN 5; 325 MG/1; MG/1
1 TABLET ORAL EVERY 8 HOURS PRN
Qty: 21 TABLET | Refills: 0 | OUTPATIENT
Start: 2025-02-28

## 2025-02-28 RX ORDER — OXYCODONE AND ACETAMINOPHEN 5; 325 MG/1; MG/1
1 TABLET ORAL EVERY 8 HOURS PRN
Qty: 21 TABLET | Refills: 0 | Status: SHIPPED | OUTPATIENT
Start: 2025-02-28

## 2025-02-28 RX ORDER — PROCHLORPERAZINE EDISYLATE 5 MG/ML
10 INJECTION INTRAMUSCULAR; INTRAVENOUS ONCE AS NEEDED
Status: DISCONTINUED | OUTPATIENT
Start: 2025-02-28 | End: 2025-02-28 | Stop reason: HOSPADM

## 2025-02-28 RX ORDER — SCOPOLAMINE 1 MG/3D
PATCH, EXTENDED RELEASE TRANSDERMAL AS NEEDED
Status: DISCONTINUED | OUTPATIENT
Start: 2025-02-28 | End: 2025-02-28 | Stop reason: SURG

## 2025-02-28 RX ORDER — DEXAMETHASONE SODIUM PHOSPHATE 4 MG/ML
INJECTION, SOLUTION INTRA-ARTICULAR; INTRALESIONAL; INTRAMUSCULAR; INTRAVENOUS; SOFT TISSUE AS NEEDED
Status: DISCONTINUED | OUTPATIENT
Start: 2025-02-28 | End: 2025-02-28 | Stop reason: SURG

## 2025-02-28 RX ORDER — FENTANYL CITRATE 50 UG/ML
INJECTION, SOLUTION INTRAMUSCULAR; INTRAVENOUS AS NEEDED
Status: DISCONTINUED | OUTPATIENT
Start: 2025-02-28 | End: 2025-02-28 | Stop reason: SURG

## 2025-02-28 RX ORDER — HALOPERIDOL 5 MG/ML
INJECTION INTRAMUSCULAR AS NEEDED
Status: DISCONTINUED | OUTPATIENT
Start: 2025-02-28 | End: 2025-02-28 | Stop reason: SURG

## 2025-02-28 RX ORDER — HYDROMORPHONE HYDROCHLORIDE 2 MG/ML
INJECTION, SOLUTION INTRAMUSCULAR; INTRAVENOUS; SUBCUTANEOUS AS NEEDED
Status: DISCONTINUED | OUTPATIENT
Start: 2025-02-28 | End: 2025-02-28 | Stop reason: SURG

## 2025-02-28 RX ORDER — MEPERIDINE HYDROCHLORIDE 25 MG/ML
12.5 INJECTION INTRAMUSCULAR; INTRAVENOUS; SUBCUTANEOUS
Status: DISCONTINUED | OUTPATIENT
Start: 2025-02-28 | End: 2025-02-28 | Stop reason: HOSPADM

## 2025-02-28 RX ADMIN — ONDANSETRON 4 MG: 2 INJECTION INTRAMUSCULAR; INTRAVENOUS at 14:22

## 2025-02-28 RX ADMIN — HALOPERIDOL LACTATE 0.5 MG: 5 INJECTION, SOLUTION INTRAMUSCULAR at 14:56

## 2025-02-28 RX ADMIN — FAMOTIDINE 20 MG: 10 INJECTION, SOLUTION INTRAVENOUS at 13:52

## 2025-02-28 RX ADMIN — DEXAMETHASONE SODIUM PHOSPHATE 5 MG: 10 INJECTION, SOLUTION INTRAMUSCULAR; INTRAVENOUS at 14:10

## 2025-02-28 RX ADMIN — HYDROMORPHONE HYDROCHLORIDE 1 MG: 2 INJECTION INTRAMUSCULAR; INTRAVENOUS; SUBCUTANEOUS at 15:18

## 2025-02-28 RX ADMIN — Medication 10 MG: at 14:40

## 2025-02-28 RX ADMIN — FENTANYL CITRATE 50 MCG: 50 INJECTION, SOLUTION INTRAMUSCULAR; INTRAVENOUS at 14:50

## 2025-02-28 RX ADMIN — MIDAZOLAM HYDROCHLORIDE 2 MG: 1 INJECTION, SOLUTION INTRAMUSCULAR; INTRAVENOUS at 14:04

## 2025-02-28 RX ADMIN — SODIUM CHLORIDE 2 G: 9 INJECTION, SOLUTION INTRAVENOUS at 14:43

## 2025-02-28 RX ADMIN — FENTANYL CITRATE 50 MCG: 50 INJECTION, SOLUTION INTRAMUSCULAR; INTRAVENOUS at 14:04

## 2025-02-28 RX ADMIN — SCOPOLAMINE 1 PATCH: 1.5 PATCH, EXTENDED RELEASE TRANSDERMAL at 14:17

## 2025-02-28 RX ADMIN — BUPIVACAINE HYDROCHLORIDE AND EPINEPHRINE 15 ML: 5; 5 INJECTION, SOLUTION EPIDURAL; INTRACAUDAL; PERINEURAL at 14:13

## 2025-02-28 RX ADMIN — DEXAMETHASONE SODIUM PHOSPHATE 4 MG: 4 INJECTION, SOLUTION INTRA-ARTICULAR; INTRALESIONAL; INTRAMUSCULAR; INTRAVENOUS; SOFT TISSUE at 14:56

## 2025-02-28 RX ADMIN — SODIUM CHLORIDE, SODIUM LACTATE, POTASSIUM CHLORIDE, CALCIUM CHLORIDE: 20; 30; 600; 310 INJECTION, SOLUTION INTRAVENOUS at 15:15

## 2025-02-28 RX ADMIN — Medication 50 MG: at 14:04

## 2025-02-28 RX ADMIN — SODIUM CHLORIDE, SODIUM LACTATE, POTASSIUM CHLORIDE, CALCIUM CHLORIDE 1000 ML: 20; 30; 600; 310 INJECTION, SOLUTION INTRAVENOUS at 13:52

## 2025-02-28 RX ADMIN — PROPOFOL 200 MG: 10 INJECTION, EMULSION INTRAVENOUS at 14:40

## 2025-02-28 RX ADMIN — DEXAMETHASONE SODIUM PHOSPHATE 5 MG: 10 INJECTION, SOLUTION INTRAMUSCULAR; INTRAVENOUS at 14:13

## 2025-02-28 RX ADMIN — BUPIVACAINE HYDROCHLORIDE AND EPINEPHRINE BITARTRATE 15 ML: 5; .0091 INJECTION, SOLUTION EPIDURAL; INTRACAUDAL; PERINEURAL at 14:10

## 2025-02-28 RX ADMIN — Medication 160 MG: at 14:40

## 2025-02-28 RX ADMIN — Medication 100 MG: at 14:40

## 2025-02-28 RX ADMIN — HYDROMORPHONE HYDROCHLORIDE 1 MG: 2 INJECTION INTRAMUSCULAR; INTRAVENOUS; SUBCUTANEOUS at 15:30

## 2025-02-28 RX ADMIN — SODIUM CHLORIDE, SODIUM LACTATE, POTASSIUM CHLORIDE, CALCIUM CHLORIDE 500 ML: 20; 30; 600; 310 INJECTION, SOLUTION INTRAVENOUS at 16:49

## 2025-02-28 NOTE — INTERVAL H&P NOTE
H&P reviewed. The patient was examined and there are no changes to the H&P.    Perfecto Penn MD  2/28/2025  13:04 EST

## 2025-02-28 NOTE — ANESTHESIA POSTPROCEDURE EVALUATION
Patient: Eri Lay    Procedure Summary       Date: 02/28/25 Room / Location: [facility] OR 4 /  [facility] OR    Anesthesia Start: 1426 Anesthesia Stop: 1603    Procedure: RIGHT ANKLE OPEN REDUCTION INTERNAL FIXATION (Right: Ankle) Diagnosis:       Pre-op testing      (Pre-op testing [Z01.818])    Surgeons: Perfecto Penn MD Provider: Shadia Mayorga CRNA    Anesthesia Type: general with block ASA Status: 3            Anesthesia Type: general with block    Vitals    Resp 12  Sat 98  /81  Temp 97        Post Anesthesia Care and Evaluation    Patient location during evaluation: PACU  Patient participation: complete - patient participated  Level of consciousness: awake and alert  Pain management: satisfactory to patient    Airway patency: patent  Anesthetic complications: No anesthetic complications    Cardiovascular status: acceptable and stable  Respiratory status: acceptable and face mask  Hydration status: acceptable

## 2025-02-28 NOTE — ANESTHESIA PROCEDURE NOTES
Peripheral Block    Pre-sedation assessment completed: 2/28/2025 2:02 PM    Patient reassessed immediately prior to procedure    Patient location during procedure: holding area  Start time: 2/28/2025 2:10 PM  Stop time: 2/28/2025 2:13 PM  Reason for block: at surgeon's request and post-op pain management  Performed by  CRNA/CAA: Favio Graham, CRNA  Preanesthetic Checklist  Completed: patient identified, IV checked, site marked, risks and benefits discussed, surgical consent, monitors and equipment checked, pre-op evaluation and timeout performed  Prep:  Pt Position: supine  Sterile barriers:cap, gloves, mask and sterile barriers  Prep: ChloraPrep  Patient monitoring: blood pressure monitoring, continuous pulse oximetry and EKG  Procedure    Sedation: yes  Performed under: MAC  Guidance:ultrasound guided    ULTRASOUND INTERPRETATION.  Using ultrasound guidance a 21 G gauge needle was placed in close proximity to the femoral nerve, at which point, under ultrasound guidance anesthetic was injected in the area of the nerve and spread of the anesthesia was seen on ultrasound in close proximity thereto.  There were no abnormalities seen on ultrasound; a digital image was taken; and the patient tolerated the procedure with no complications. Images:still images obtained  Loss of twitch: 0.5 mA  Laterality:right  Block Type:adductor canal block  Injection Technique:single-shot  Needle Type:echogenic  Needle Gauge:21 G  Resistance on Injection: none    Medications Used: bupivacaine 0.5%-EPINEPHrine PF (MARCAINE w/ EPI) injection - Injection   15 mL - 2/28/2025 2:13:00 PM      Medications  Preservative Free Saline:5ml  Comment:Adjuncts per total volume of LA:    Decadron 5 mg PSF    If required, intravenous sedation was given -- see meds on anesthesia record.    Post Assessment  Injection Assessment: negative aspiration for heme, no paresthesia on injection and incremental injection  Patient Tolerance:comfortable  throughout block  Complications:no  Additional Notes  Procedure:          SINGLE ADDUCTOR CANAL BLOCK                                 Patient analgesia was achieved with IV Sedation( see meds)       The pt was placed in the Supine position.  The Insertion site was  prepped and Draped in sterile fashion.  A BBraun echogenic needle was then  inserted approximately midline, mid-thigh and advanced In-plane with Ultrasound guidance. The Vastus medialis and Sartorius muscle were visualized and the needle tip was placed in the adductor canal,  lateral to the femoral artery.  LA injection spread was visualized, injection was incremental 1-5 ml; injection pressure was normal or little; no intraneural injection; no vascular injection. LA dose was injected thru the needle (see dose above).      Performed by: Favio Graham CRNA

## 2025-02-28 NOTE — TELEPHONE ENCOUNTER
For some reason, patient had oxycodone (percocet) pre-op so I went ahead and wrote for the same.   Mother is RH Positive

## 2025-02-28 NOTE — ANESTHESIA PROCEDURE NOTES
Airway  Urgency: elective    Date/Time: 2/28/2025 2:41 PM  Airway not difficult    General Information and Staff    Patient location during procedure: OR  CRNA/CAA: Shadia Mayorga CRNA    Indications and Patient Condition  Indications for airway management: airway protection    Preoxygenated: yes  Mask difficulty assessment: 0 - not attempted    Final Airway Details  Final airway type: endotracheal airway      Successful airway: ETT  Cuffed: yes   Successful intubation technique: direct laryngoscopy  Facilitating devices/methods: intubating stylet  Endotracheal tube insertion site: oral  Blade: Danika  Blade size: 3  ETT size (mm): 7.0  Cormack-Lehane Classification: grade I - full view of glottis  Placement verified by: chest auscultation and capnometry   Cuff volume (mL): 6  Measured from: lips  ETT/EBT  to lips (cm): 21  Number of attempts at approach: 1  Assessment: lips, teeth, and gum same as pre-op and atraumatic intubation    Additional Comments  +ETCO2, BBS,

## 2025-02-28 NOTE — ANESTHESIA PREPROCEDURE EVALUATION
Anesthesia Evaluation     Patient summary reviewed and Nursing notes reviewed   NPO Solid Status: > 8 hours  NPO Liquid Status: > 8 hours           Airway   Mallampati: II  TM distance: >3 FB  Neck ROM: full  Possible difficult intubation and Difficult intubation highly probable  Dental      Pulmonary    (+) asthma,shortness of breath, sleep apnea, decreased breath sounds  Cardiovascular     (+) hypertension, PEARSON, PVD      Neuro/Psych  (+) psychiatric history  GI/Hepatic/Renal/Endo    (+) obesity, morbid obesity, liver disease fatty liver disease    Musculoskeletal     (+) arthralgias, myalgias  Abdominal   (+) obese   Substance History      OB/GYN          Other                    Anesthesia Plan    ASA 3     general with block     (Risks and benefits discussed including risk of aspiration, recall and dental damage. All patient questions answered.    Will continue with plan of care.)  intravenous induction     Anesthetic plan, risks, benefits, and alternatives have been provided, discussed and informed consent has been obtained with: patient.  Pre-procedure education provided    CODE STATUS:

## 2025-02-28 NOTE — ANESTHESIA PROCEDURE NOTES
Peripheral Block    Pre-sedation assessment completed: 2/28/2025 2:02 PM    Patient reassessed immediately prior to procedure    Patient location during procedure: holding area  Start time: 2/28/2025 2:06 PM  Stop time: 2/28/2025 2:10 PM  Reason for block: at surgeon's request and post-op pain management  Performed by  CRNA/CAA: Favio Graham, CRNA  Preanesthetic Checklist  Completed: patient identified, IV checked, site marked, risks and benefits discussed, surgical consent, monitors and equipment checked, pre-op evaluation and timeout performed  Prep:  Pt Position: supine  Sterile barriers:cap, gloves, mask and sterile barriers  Prep: ChloraPrep  Patient monitoring: blood pressure monitoring, continuous pulse oximetry and EKG  Procedure    Sedation: yes  Performed under: MAC  Guidance:ultrasound guided    ULTRASOUND INTERPRETATION.  Using ultrasound guidance a 21 G gauge needle was placed in close proximity to the femoral nerve, at which point, under ultrasound guidance anesthetic was injected in the area of the nerve and spread of the anesthesia was seen on ultrasound in close proximity thereto.  There were no abnormalities seen on ultrasound; a digital image was taken; and the patient tolerated the procedure with no complications. Images:still images obtained  Loss of twitch: 0.5 mA  Laterality:right  Block Type:popliteal  Injection Technique:single-shot  Needle Type:echogenic  Needle Gauge:21 G  Resistance on Injection: none    Medications Used: bupivacaine 0.5%-EPINEPHrine PF (MARCAINE w/ EPI) injection - Injection   15 mL - 2/28/2025 2:10:00 PM      Medications  Preservative Free Saline:5ml  Comment:Adjuncts per total volume of LA:    Decadron 5 mg PSF      If required, intravenous sedation was given -- see meds on anesthesia record.    Post Assessment  Injection Assessment: negative aspiration for heme, no paresthesia on injection and incremental injection  Patient Tolerance:comfortable throughout  block  Complications:no  Additional Notes  Procedure:                  SINGLE SHOT POPLITEAL                                         Patient analgesia was achieved with IV Sedation( see meds)       The pt was placed in a Supine with Leg Elevated position.  The Insertion site was  prepped and draped in sterile fashion. Skin and subcutaneous tissue was infiltrated and anesthetized with 1% Lidocaine via a 25g needle.  A BBraun echogenic needle was then  inserted approximately 3 cm proximal to the popliteal fossa at the lateral mid biceps femoris and advanced In-plane with Ultrasound guidance. The popliteal artery was visualized and the common peroneal and tibial bifurcation was located.  LA injection spread was visualized, injection was incremental 1-5 ml; injection pressure was normal or little, no intraneural injection, no vascular injection.  Performed by: Favio Graham, CRNA

## 2025-03-01 NOTE — OP NOTE
[address],  [address]  [place]  [address]  [phone]      OPERATIVE REPORT      PATIENT NAME:  Eri Lay                            YOB: 2005        PREOP DIAGNOSIS:   Right ankle acute mild displaced unstable distal fibula fracture, Calderón C pattern with syndesmosis ligament injury.    POSTOP DIAGNOSIS:   Same.    PROCEDURE:    Open reduction and internal fixation of right ankle distal fibula fracture with syndesmosis ligament stabilization.    SURGEON:     Chadd Penn MD    OPERATIVE TEAM:   Circulator: Diana Reynoso RN; Trang Bedoya, RN; Orin Kirby RN  Radiology Technologist: Nilesh Bush  Scrub Person: Mckenzie Walden PCT; Amanda Cesar; Natalie Ortiz  Vendor Representative: Thomas Franco    ANESTHETIST:  CRNA: Nick Yousif CRNA; Shadia Mayorga CRNA    ANESTHESIA:  General plus regional.    ESTIM BLOOD LOSS:   25 ml    TOURNIQUET TIME:   Not used.    FINDINGS:     Mild displaced unstable short oblique high distal fibula ankle fracture with syndesmosis ligament injury and medial clear space widening ankle joint instability.    IMPLANTS:     Synthes locking small fragment seven hole 1/3 semitubular plate with a combination of six total screws (three cortical and three locking screws), and a [employer] Juggerloc syndesmosis fixation suture button across the remaining hole in the plate at the level of the syndesmosis ligament.    COMPLICATIONS:    None.    DISPOSITION:    Stable to recovery.     INDICATIONS:    Unstable mild displaced  distal fibula ankle fracture with syndesmosis ligament injury.    NARRATIVE:    Clinical exam and imaging reveal an unstable displaced distal fibula ankle fracture and syndesmosis ligament disruption.  Recommended surgical reduction and stabilization for the distal fibula ankle fracture and syndesmosis ligament was reviewed.  Risks, benefits and alternatives discussed and informed  consent for surgical procedure obtained. Risks discussed including but not limited to anesthesia, infection, fracture malunion, nonunion, DVT, PE, post-traumatic arthritis, and persistent pain or limitations from the fracture.  Goals include the potential for earlier pain relief, improved fracture position and healing potential, improved ankle mobility and functional outcome.  The patient presents for ankle fracture surgery.    Antibiotic prophylaxis was given.  Surgeon site marking and a time out were performed prior to the procedure.  Anesthesia was effective and well tolerated.  The leg and foot was prepped and draped in the usual sterile fashion.  A tourniquet was not used and there was good hemostasis throughout the procedure.  After sterile prep and drape a lateral ankle incision was made for approach to the fracture.  The soft tissues were protected and with subperiosteal dissection the fracture was visualized.  With a bone holding clamp, and with anatomic reduction, the distal fibula was stabilized with a contoured 1/3 semitubular plate with hybrid screw fixation optimizing use of cortical and locking screws.  These steps were achieved with blunt retractors, soft tissue sleeve protected drilling, depth gauge measurement and screw placement.      With C-arm fluoroscopic imaging a push pull test and rotation stress tests revealed mild to moderate medial joint space widening consistent with some tear to the syndesmosis ligament.  This was addressed with placement of a syndesmosis fixation button across the distal fibula plate at the remaining plate hole at the level of the syndesmosis ligament.  The Juggerloc bone to bone syndesmosis fixation device was placed with standard drilling through the distal fibula bicortical and across to the distal tibia unicortical and into the intramedullary space.  The drill was aimed 15 degrees anterior to enter the central portion of the tibia,  The over-drill was used to widen  only the fibula bone, and the device was placed gently tapping with a mallet to deploy the Juggerlock knot into the distal tibia.  Next sequential pulling of the suture limbs permitted tightening of the cord and abutment of the knot against the inner cortical wall of the tibia.  The button now was sitting flush on the lateral cortical margin of the distal fibula.  This step was done with the foot dorsiflexed placing the wider anterior part of the talus into the mortise space of the distal tibia to prevent over-compression.  The sutures were tied and then cut close to the button completing the Juggerloc placement.  Final C-arm images were saved with a good anatomic bone reduction, good position of the screws, a smooth ankle joint space and now with improved stable ankle stress tests.    The area was irrigated copiously with antibiotic solution and suctioned clear at several intervals during the procedure.  Routine closure performed using 0 Vicryl sutures for the fascia layer, 2-0 Vicryl sutures for the subcutaneous layer and steri-strips for the skin.  A sterile padded Aquacel dressing, ace wrap and well padded ankle fracture boot were applied.  Anesthesia was effective and well tolerated.  There were no complications of the procedure.  The patient was transferred in stable condition to recovery.

## 2025-03-03 ENCOUNTER — TELEPHONE (OUTPATIENT)
Dept: ORTHOPEDIC SURGERY | Facility: CLINIC | Age: 20
End: 2025-03-03
Payer: MEDICAID

## 2025-03-03 NOTE — TELEPHONE ENCOUNTER
Caller:  BRADLEY   Relationship to Patient: SELF  Phone Number:  [phone]  What form or medical record are you requesting PATIENT ASKING FOR A SCHOOL NOT TO EXCUSE HER FOR THIS ENTIRE WEEK 03/03/25- 05/07/25    How would you like to receive the form or medical records (pick-up, mail, fax): MAIL   If fax, what is the fax number: [address] [facility] [address]

## 2025-03-07 ENCOUNTER — OFFICE VISIT (OUTPATIENT)
Dept: ORTHOPEDIC SURGERY | Facility: CLINIC | Age: 20
End: 2025-03-07
Payer: MEDICAID

## 2025-03-07 VITALS
BODY MASS INDEX: 40.97 KG/M2 | WEIGHT: 240 LBS | SYSTOLIC BLOOD PRESSURE: 130 MMHG | DIASTOLIC BLOOD PRESSURE: 86 MMHG | HEIGHT: 64 IN

## 2025-03-07 DIAGNOSIS — S93.431D SYNDESMOTIC DISRUPTION OF RIGHT ANKLE, SUBSEQUENT ENCOUNTER: ICD-10-CM

## 2025-03-07 DIAGNOSIS — Z98.890 S/P ORIF (OPEN REDUCTION INTERNAL FIXATION) FRACTURE: ICD-10-CM

## 2025-03-07 DIAGNOSIS — Z87.81 S/P ORIF (OPEN REDUCTION INTERNAL FIXATION) FRACTURE: ICD-10-CM

## 2025-03-07 DIAGNOSIS — Z98.890 STATUS POST OPEN REDUCTION WITH INTERNAL FIXATION (ORIF) OF FRACTURE OF ANKLE: ICD-10-CM

## 2025-03-07 DIAGNOSIS — S82.831D OTHER CLOSED FRACTURE OF DISTAL END OF RIGHT FIBULA WITH ROUTINE HEALING, SUBSEQUENT ENCOUNTER: Primary | ICD-10-CM

## 2025-03-07 DIAGNOSIS — Z87.81 STATUS POST OPEN REDUCTION WITH INTERNAL FIXATION (ORIF) OF FRACTURE OF ANKLE: ICD-10-CM

## 2025-03-07 PROCEDURE — 99024 POSTOP FOLLOW-UP VISIT: CPT | Performed by: STUDENT IN AN ORGANIZED HEALTH CARE EDUCATION/TRAINING PROGRAM

## 2025-03-07 PROCEDURE — 1159F MED LIST DOCD IN RCRD: CPT | Performed by: STUDENT IN AN ORGANIZED HEALTH CARE EDUCATION/TRAINING PROGRAM

## 2025-03-07 PROCEDURE — 1160F RVW MEDS BY RX/DR IN RCRD: CPT | Performed by: STUDENT IN AN ORGANIZED HEALTH CARE EDUCATION/TRAINING PROGRAM

## 2025-03-07 RX ORDER — HYDROCODONE BITARTRATE AND ACETAMINOPHEN 7.5; 325 MG/1; MG/1
1 TABLET ORAL EVERY 8 HOURS PRN
Qty: 21 TABLET | Refills: 0 | Status: SHIPPED | OUTPATIENT
Start: 2025-03-07 | End: 2025-03-14

## 2025-03-07 NOTE — PROGRESS NOTES
"    Post Op Note     Name: Eri Lay    : 2005     MRN: [id number]     Chief Complaint  Post-op of the Right Ankle (Status pos ORIF 25.)    Subjective     History of Present Illness:  Eri Lay is a 19 y.o. female who presents today for 1 week post op wound check s/p open reduction and internal fixation of right ankle distal fibula fracture with syndesmosis ligament stabilization.  She complains of pain in the lateral right ankle that isn't well controlled with her post op pain med, Norco 5mg q8.  Reports good compliance with wound care and weight bearing parameters.  Has decreased sensation over lateral ankle and foot.  Denies symptoms of systemic illness.      Review of Systems     Pain controlled: [x] no   [] yes   Medication refill requested: [] no   [x] yes    Patient compliant with instructions: [] no   [x] yes   Other: Reports excellent progress since surgery.     Past Medical History:   Diagnosis Date    Anxiety     Asthma     Depression     Hypertension         Past Surgical History:   Procedure Laterality Date    ANKLE OPEN REDUCTION INTERNAL FIXATION Right 2025    Procedure: RIGHT ANKLE OPEN REDUCTION INTERNAL FIXATION;  Surgeon: Perfecto Penn MD;  Location: [facility];  Service: Orthopedics;  Laterality: Right;       Allergies   Allergen Reactions    Ibuprofen Shortness Of Breath       Objective   /86   Ht 162.6 cm (64.02\")   Wt 109 kg (240 lb)   LMP 2024 (Approximate)   BMI 41.18 kg/m²             Signs of infection: [x] no                    [] yes   Drainage: [x] no                    [] yes   Incision: [x] healing well     []healed well   Motor exam intact: [] no                    [x] yes   Neurovascular exam intact: [] no                    [x] yes   Signs of compartment syndrome: [x] no                    [] yes   Signs of DVT: [x] no                    [] yes   Other: Incision is closed with internal sutures, appears to be healing appropriately with " no signs of infection.  Mild tenderness in achilles tendon, no gapping.  AROM within expected limits.  Mild decreased sensation along incision and lateral ankle.  Moves all toes well, distal NV checks within normal limits.      Physical Exam  Ortho Exam    Extremity DVT signs are negative by clinical screen.    dependent Review of Radiographic Studies:    No new imaging was done today.    Laboratory and Other Studies:  No new results reviewed today.     Medical Decision Making:    Stable post-operative exam and expected early progress.    Procedures    Assessment and Plan     Diagnoses and all orders for this visit:    1. Other closed fracture of distal end of right fibula with routine healing, subsequent encounter (Primary)    2. Syndesmotic disruption of right ankle, subsequent encounter    3. S/P ORIF (open reduction internal fixation) fracture        Recommendations/Plan:     Sutures Staples or Pins [] Removed today  [] At prior visit  [] Plan removal later   Physical therapy: []rehab facility  []outpatient referral  [] therapy ongoing   Ultrasound: [x]not ordered         []order given to patient   Labs: [x]not ordered         []order given to patient   Weight Bearing status: []Full []WBAT []PWB [x]NWB []Other     Discussion of orthopaedic goals and activities and patient and/or guardian expressed appreciation.  Guided on proper techniques for mobility, strength, agility and/or conditioning exercises  Weight bearing parameters reviewed  Take prescribed medications as instructed only as tolerated     Exercise, medications, injections, other patient advice, and return appointment as noted.  Brace: No brace was given at today's visit.  Referral: No referrals made at today's visit.  Test/Studies: No additional studies ordered at this time.  Work/Activity Status: No use of involved extremity.    Refill request sent to Dr. Penn for Norco 7.5mg q8 for 7 days.     Return in about 1 week (around 3/14/2025) for 2 week  post op.  Patient is encouraged and agreeable to call or return sooner for any issues or concerns.

## 2025-03-10 ENCOUNTER — PRIOR AUTHORIZATION (OUTPATIENT)
Dept: ORTHOPEDIC SURGERY | Facility: CLINIC | Age: 20
End: 2025-03-10
Payer: MEDICAID

## 2025-03-10 NOTE — TELEPHONE ENCOUNTER
Obtained prior auth through [employer] for Hydrocodone post operatively x 30 days for original and tapered doses, called Mark to inform

## 2025-03-13 DIAGNOSIS — S93.431D SYNDESMOTIC DISRUPTION OF RIGHT ANKLE, SUBSEQUENT ENCOUNTER: ICD-10-CM

## 2025-03-13 DIAGNOSIS — S82.831D OTHER CLOSED FRACTURE OF DISTAL END OF RIGHT FIBULA WITH ROUTINE HEALING, SUBSEQUENT ENCOUNTER: ICD-10-CM

## 2025-03-13 DIAGNOSIS — Z98.890 STATUS POST OPEN REDUCTION WITH INTERNAL FIXATION (ORIF) OF FRACTURE OF ANKLE: Primary | ICD-10-CM

## 2025-03-13 DIAGNOSIS — Z87.81 STATUS POST OPEN REDUCTION WITH INTERNAL FIXATION (ORIF) OF FRACTURE OF ANKLE: Primary | ICD-10-CM

## 2025-03-14 ENCOUNTER — OFFICE VISIT (OUTPATIENT)
Dept: ORTHOPEDIC SURGERY | Facility: CLINIC | Age: 20
End: 2025-03-14
Payer: MEDICAID

## 2025-03-14 VITALS
WEIGHT: 240 LBS | BODY MASS INDEX: 40.97 KG/M2 | HEIGHT: 64 IN | SYSTOLIC BLOOD PRESSURE: 122 MMHG | DIASTOLIC BLOOD PRESSURE: 80 MMHG

## 2025-03-14 DIAGNOSIS — S93.431D SYNDESMOTIC DISRUPTION OF RIGHT ANKLE, SUBSEQUENT ENCOUNTER: ICD-10-CM

## 2025-03-14 DIAGNOSIS — Z87.81 STATUS POST OPEN REDUCTION WITH INTERNAL FIXATION (ORIF) OF FRACTURE OF ANKLE: Primary | ICD-10-CM

## 2025-03-14 DIAGNOSIS — S82.831D OTHER CLOSED FRACTURE OF DISTAL END OF RIGHT FIBULA WITH ROUTINE HEALING, SUBSEQUENT ENCOUNTER: ICD-10-CM

## 2025-03-14 DIAGNOSIS — Z98.890 STATUS POST OPEN REDUCTION WITH INTERNAL FIXATION (ORIF) OF FRACTURE OF ANKLE: Primary | ICD-10-CM

## 2025-03-14 PROCEDURE — 1159F MED LIST DOCD IN RCRD: CPT | Performed by: STUDENT IN AN ORGANIZED HEALTH CARE EDUCATION/TRAINING PROGRAM

## 2025-03-14 PROCEDURE — 99024 POSTOP FOLLOW-UP VISIT: CPT | Performed by: STUDENT IN AN ORGANIZED HEALTH CARE EDUCATION/TRAINING PROGRAM

## 2025-03-14 PROCEDURE — 1160F RVW MEDS BY RX/DR IN RCRD: CPT | Performed by: STUDENT IN AN ORGANIZED HEALTH CARE EDUCATION/TRAINING PROGRAM

## 2025-03-14 NOTE — PROGRESS NOTES
"    Post Op Note     Name: Eri Lay    : 2005     MRN: [id number]     Chief Complaint  Post-op of the Right Ankle (Status pos ORIF 25.)    Subjective     History of Present Illness:  Eri Lay is a 19 y.o. female who presents today for 2-week postop status post right ankle ORIF done 2025.  Patient states that she is doing very well at this time and denies any significant pain in her ankle.  She states the incision is healing well and has not noticed any signs or symptoms of infection.  Reports good compliance with nonweightbearing.    Review of Systems     Pain controlled: [] no   [x] yes   Medication refill requested: [x] no   [] yes    Patient compliant with instructions: [] no   [x] yes   Other: Reports excellent progress since surgery.     Past Medical History:   Diagnosis Date    Anxiety     Asthma     Depression     Hypertension         Past Surgical History:   Procedure Laterality Date    ANKLE OPEN REDUCTION INTERNAL FIXATION Right 2025    Procedure: RIGHT ANKLE OPEN REDUCTION INTERNAL FIXATION;  Surgeon: Perfecto Penn MD;  Location: [facility];  Service: Orthopedics;  Laterality: Right;       Allergies   Allergen Reactions    Ibuprofen Shortness Of Breath       Objective   /80   Ht 162.6 cm (64.02\")   Wt 109 kg (240 lb)   LMP 2024 (Approximate)   BMI 41.18 kg/m²             Signs of infection: [x] no                    [] yes   Drainage: [x] no                    [] yes   Incision: [x] healing well     []healed well   Motor exam intact: [] no                    [x] yes   Neurovascular exam intact: [] no                    [x] yes   Signs of compartment syndrome: [x] no                    [] yes   Signs of DVT: [x] no                    [] yes   Other: Able to slightly move in all directions with right ankle though limited due to pain.  Incision appears to be healing appropriately with no signs of dehiscence or infection.     Physical Exam  Ortho Exam  "   Extremity DVT signs are negative by clinical screen.    dependent Review of Radiographic Studies:    Three-view plain films of the right ankle were done in office today for the evaluation of fracture healing and postoperative changes, comparison views available.  Distal fibular ORIF is noted, hardware appears to be in its proper place and position without evidence of movement.  Fracture is well reduced to near-anatomic position.  Bone tunnel is noted for syndesmosis repair.  No interval change from last exam.    Laboratory and Other Studies:  No new results reviewed today.     Medical Decision Making:    Stable post-operative exam and expected early progress.    Procedures    Assessment and Plan     Diagnoses and all orders for this visit:    1. Status post open reduction with internal fixation (ORIF) of fracture of ankle (Primary)    2. Other closed fracture of distal end of right fibula with routine healing, subsequent encounter    3. Syndesmotic disruption of right ankle, subsequent encounter        Recommendations/Plan:     Sutures Staples or Pins [] Removed today  [] At prior visit  [] Plan removal later   Physical therapy: []rehab facility  []outpatient referral  [] therapy ongoing   Ultrasound: [x]not ordered         []order given to patient   Labs: [x]not ordered         []order given to patient   Weight Bearing status: []Full []WBAT [x]PWB []NWB []Other     Discussion of orthopaedic goals and activities and patient and/or guardian expressed appreciation.  Guided on proper techniques for mobility, strength, agility and/or conditioning exercises  Weight bearing parameters reviewed  Take prescribed medications as instructed only as tolerated     Exercise, medications, injections, other patient advice, and return appointment as noted.  Brace: No brace was given at today's visit.  Referral: No referrals made at today's visit.  Test/Studies: No additional studies ordered at this time.  Work/Activity Status:   Feather light to partial weightbearing for right ankle with walking boot and assistive device.       Return in about 4 weeks (around 4/11/2025) for XOA.  Patient is encouraged and agreeable to call or return sooner for any issues or concerns.

## 2025-04-11 ENCOUNTER — OFFICE VISIT (OUTPATIENT)
Dept: ORTHOPEDIC SURGERY | Facility: CLINIC | Age: 20
End: 2025-04-11
Payer: MEDICAID

## 2025-04-11 VITALS
WEIGHT: 240 LBS | HEIGHT: 64 IN | BODY MASS INDEX: 40.97 KG/M2 | SYSTOLIC BLOOD PRESSURE: 150 MMHG | DIASTOLIC BLOOD PRESSURE: 90 MMHG

## 2025-04-11 DIAGNOSIS — Z87.81 STATUS POST OPEN REDUCTION WITH INTERNAL FIXATION (ORIF) OF FRACTURE OF ANKLE: Primary | ICD-10-CM

## 2025-04-11 DIAGNOSIS — Z98.890 STATUS POST OPEN REDUCTION WITH INTERNAL FIXATION (ORIF) OF FRACTURE OF ANKLE: Primary | ICD-10-CM

## 2025-04-11 DIAGNOSIS — S93.431D SYNDESMOTIC DISRUPTION OF RIGHT ANKLE, SUBSEQUENT ENCOUNTER: ICD-10-CM

## 2025-04-11 DIAGNOSIS — S82.831D OTHER CLOSED FRACTURE OF DISTAL END OF RIGHT FIBULA WITH ROUTINE HEALING, SUBSEQUENT ENCOUNTER: ICD-10-CM

## 2025-04-11 NOTE — PROGRESS NOTES
"    Post Op Note     Name: Eri Lay    : 2005     MRN: [id number]     Chief Complaint  Post-op of the Right Ankle (Status pos ORIF 25.)    Subjective     History of Present Illness:  Eri Lay is a 19 y.o. female who presents today for 6-week postop follow-up for right ankle ORIF done on 2025.  Patient states that she is doing very well and denies any significant pain at this time.  Denies any new or worsening symptoms since her last visit with me.  Denies numbness in her foot but does have some tingling over the lateral aspect of the dorsal foot.    Review of Systems     Pain controlled: [] no   [x] yes   Medication refill requested: [x] no   [] yes    Patient compliant with instructions: [] no   [x] yes   Other: Reports excellent progress since surgery.     Past Medical History:   Diagnosis Date    Anxiety     Asthma     Depression     Hypertension         Past Surgical History:   Procedure Laterality Date    ANKLE OPEN REDUCTION INTERNAL FIXATION Right 2025    Procedure: RIGHT ANKLE OPEN REDUCTION INTERNAL FIXATION;  Surgeon: Perfecto Penn MD;  Location: [facility];  Service: Orthopedics;  Laterality: Right;       Allergies   Allergen Reactions    Ibuprofen Shortness Of Breath       Objective   /90   Ht 162.6 cm (64.02\")   Wt 109 kg (240 lb)   BMI 41.18 kg/m²             Signs of infection: [x] no                    [] yes   Drainage: [x] no                    [] yes   Incision: [x] healing well     []healed well   Motor exam intact: [] no                    [x] yes   Neurovascular exam intact: [] no                    [x] yes   Signs of compartment syndrome: [x] no                    [] yes   Signs of DVT: [x] no                    [] yes   Other:      Physical Exam  Right Ankle Exam     Tenderness   The patient is experiencing no tenderness.  Swelling: none    Range of Motion   The patient has normal right ankle ROM.    Muscle Strength   The patient has normal right " ankle strength.    Other   Erythema: absent  Sensation: normal  Pulse: present             Extremity DVT signs are negative by clinical screen.    dependent Review of Radiographic Studies:    Three-view plain films of the right ankle were done in office today for the evaluation of fracture healing and postoperative changes, comparison views available.  Distal fibular ORIF is noted, hardware appears to be in its proper place and position without evidence of movement.  Fracture is well reduced to near-anatomic position.  Bone tunnel is noted for syndesmosis repair.  No interval change from last exam.    Laboratory and Other Studies:  No new results reviewed today.     Medical Decision Making:    Good progress, significantly improved.  Continue current treatment plan.    Procedures    Assessment and Plan     Diagnoses and all orders for this visit:    1. Status post open reduction with internal fixation (ORIF) of fracture of ankle (Primary)  -     XR Ankle 3+ View Right; Future  -     Ambulatory Referral to Physical Therapy for Evaluation & Treatment    2. Other closed fracture of distal end of right fibula with routine healing, subsequent encounter  -     XR Ankle 3+ View Right; Future  -     Ambulatory Referral to Physical Therapy for Evaluation & Treatment    3. Syndesmotic disruption of right ankle, subsequent encounter  -     XR Ankle 3+ View Right; Future  -     Ambulatory Referral to Physical Therapy for Evaluation & Treatment        Recommendations/Plan:     Sutures Staples or Pins [] Removed today  [x] At prior visit  [] Plan removal later   Physical therapy: []rehab facility  [x]outpatient referral  [] therapy ongoing   Ultrasound: [x]not ordered         []order given to patient   Labs: [x]not ordered         []order given to patient   Weight Bearing status: []Full [x]WBAT []PWB []NWB []Other     Discussion of orthopaedic goals and activities and patient and/or guardian expressed appreciation.  Guided on  proper techniques for mobility, strength, agility and/or conditioning exercises  Weight bearing parameters reviewed  Take prescribed medications as instructed only as tolerated     Exercise, medications, injections, other patient advice, and return appointment as noted.  Brace: No brace was given at today's visit.  Referral: Physical and Occupational Therapy referral.  Test/Studies: No additional studies ordered at this time.  Work/Activity Status: Usual activities, routine exercise as tolerated, light physical work as tolerated, no strenuous activity.    Return in about 6 weeks (around 5/23/2025) for XOA.  Patient is encouraged and agreeable to call or return sooner for any issues or concerns.

## 2025-05-20 DIAGNOSIS — S82.831D OTHER CLOSED FRACTURE OF DISTAL END OF RIGHT FIBULA WITH ROUTINE HEALING, SUBSEQUENT ENCOUNTER: ICD-10-CM

## 2025-05-20 DIAGNOSIS — Z98.890 STATUS POST OPEN REDUCTION WITH INTERNAL FIXATION (ORIF) OF FRACTURE OF ANKLE: Primary | ICD-10-CM

## 2025-05-20 DIAGNOSIS — S93.431D SYNDESMOTIC DISRUPTION OF RIGHT ANKLE, SUBSEQUENT ENCOUNTER: ICD-10-CM

## 2025-05-20 DIAGNOSIS — Z87.81 STATUS POST OPEN REDUCTION WITH INTERNAL FIXATION (ORIF) OF FRACTURE OF ANKLE: Primary | ICD-10-CM

## (undated) DEVICE — DRSNG SURG AQUACEL AG/ADVNTGE 9X15CM 3.5X6IN

## (undated) DEVICE — ANTIBACTERIAL UNDYED BRAIDED (POLYGLACTIN 910), SYNTHETIC ABSORBABLE SUTURE: Brand: COATED VICRYL

## (undated) DEVICE — GLOVE,SURG,SENSICARE SLT,LF,PF,8: Brand: MEDLINE

## (undated) DEVICE — PATIENT RETURN ELECTRODE, SINGLE-USE, CONTACT QUALITY MONITORING, ADULT, WITH 9FT CORD, FOR PATIENTS WEIGING OVER 33LBS. (15KG): Brand: MEGADYNE

## (undated) DEVICE — DRP SURG U/DRP INVISISHIELD PA 48X52IN

## (undated) DEVICE — SHEET,DRAPE,70X100,STERILE: Brand: MEDLINE

## (undated) DEVICE — INTENDED FOR TISSUE SEPARATION, AND OTHER PROCEDURES THAT REQUIRE A SHARP SURGICAL BLADE TO PUNCTURE OR CUT.: Brand: BARD-PARKER ® CARBON RIB-BACK BLADES

## (undated) DEVICE — BIT DRL QC DIA 2.8X165MM NS

## (undated) DEVICE — ANTIBACTERIAL VIOLET BRAIDED (POLYGLACTIN 910), SYNTHETIC ABSORBABLE SUTURE: Brand: COATED VICRYL

## (undated) DEVICE — FLEXIBLE YANKAUER,MEDIUM TIP, NO VACUUM CONTROL: Brand: ARGYLE

## (undated) DEVICE — BIT DRL QC DIA 2.5X180MM

## (undated) DEVICE — COVER,C-ARM,41X74: Brand: MEDLINE

## (undated) DEVICE — STRIP,CLOSURE,WOUND,MEDI-STRIP,1/2X4: Brand: MEDLINE

## (undated) DEVICE — TUBING, SUCTION, 1/4" X 12', STRAIGHT: Brand: MEDLINE

## (undated) DEVICE — ADHS LIQ MASTISOL 2/3ML

## (undated) DEVICE — GLV SURG SENSICARE ORTHO PF LF 8 STRL

## (undated) DEVICE — SLV SCD CALF HEMOFORCE DVT THERP REPROC MD

## (undated) DEVICE — SUT VIC 2/0 CT1 27IN J259H

## (undated) DEVICE — PK EXTRM LOWR 20